# Patient Record
Sex: FEMALE | Race: WHITE | Employment: OTHER | URBAN - METROPOLITAN AREA
[De-identification: names, ages, dates, MRNs, and addresses within clinical notes are randomized per-mention and may not be internally consistent; named-entity substitution may affect disease eponyms.]

---

## 2021-02-02 ENCOUNTER — TRANSFERRED RECORDS (OUTPATIENT)
Dept: HEALTH INFORMATION MANAGEMENT | Facility: CLINIC | Age: 71
End: 2021-02-02

## 2021-02-12 ENCOUNTER — TRANSFERRED RECORDS (OUTPATIENT)
Dept: HEALTH INFORMATION MANAGEMENT | Facility: CLINIC | Age: 71
End: 2021-02-12

## 2021-02-17 ENCOUNTER — OFFICE VISIT (OUTPATIENT)
Dept: OTHER | Facility: CLINIC | Age: 71
End: 2021-02-17
Attending: SURGERY
Payer: COMMERCIAL

## 2021-02-17 ENCOUNTER — TRANSFERRED RECORDS (OUTPATIENT)
Dept: HEALTH INFORMATION MANAGEMENT | Facility: CLINIC | Age: 71
End: 2021-02-17

## 2021-02-17 ENCOUNTER — TELEPHONE (OUTPATIENT)
Dept: OTHER | Facility: CLINIC | Age: 71
End: 2021-02-17

## 2021-02-17 VITALS
BODY MASS INDEX: 25 KG/M2 | HEIGHT: 59 IN | RESPIRATION RATE: 16 BRPM | SYSTOLIC BLOOD PRESSURE: 153 MMHG | WEIGHT: 124 LBS | DIASTOLIC BLOOD PRESSURE: 75 MMHG | HEART RATE: 75 BPM | OXYGEN SATURATION: 100 %

## 2021-02-17 DIAGNOSIS — I65.22 LEFT CAROTID ARTERY STENOSIS: Primary | ICD-10-CM

## 2021-02-17 DIAGNOSIS — Z11.59 ENCOUNTER FOR SCREENING FOR OTHER VIRAL DISEASES: ICD-10-CM

## 2021-02-17 DIAGNOSIS — E78.5 HYPERLIPIDEMIA LDL GOAL <70: ICD-10-CM

## 2021-02-17 PROCEDURE — G0463 HOSPITAL OUTPT CLINIC VISIT: HCPCS

## 2021-02-17 PROCEDURE — 99204 OFFICE O/P NEW MOD 45 MIN: CPT | Performed by: SURGERY

## 2021-02-17 RX ORDER — METHOCARBAMOL 750 MG/1
750-1500 TABLET, FILM COATED ORAL
COMMUNITY
Start: 2021-01-26 | End: 2021-02-22

## 2021-02-17 RX ORDER — AMLODIPINE BESYLATE 10 MG/1
10 TABLET ORAL EVERY MORNING
COMMUNITY
Start: 2020-08-20

## 2021-02-17 RX ORDER — PHENOL 1.4 %
2.5 AEROSOL, SPRAY (ML) MUCOUS MEMBRANE EVERY MORNING
COMMUNITY

## 2021-02-17 RX ORDER — AMOXICILLIN 500 MG
CAPSULE ORAL
COMMUNITY
End: 2021-02-22

## 2021-02-17 RX ORDER — BUSPIRONE HYDROCHLORIDE 15 MG/1
15 TABLET ORAL 2 TIMES DAILY
COMMUNITY
Start: 2021-01-15

## 2021-02-17 RX ORDER — PAROXETINE 40 MG/1
40 TABLET, FILM COATED ORAL EVERY MORNING
COMMUNITY
Start: 2021-01-14

## 2021-02-17 RX ORDER — METOPROLOL SUCCINATE 25 MG/1
25 TABLET, EXTENDED RELEASE ORAL EVERY EVENING
COMMUNITY
Start: 2020-10-07

## 2021-02-17 RX ORDER — ATORVASTATIN CALCIUM 10 MG/1
10 TABLET, FILM COATED ORAL EVERY EVENING
COMMUNITY
Start: 2020-08-20 | End: 2021-08-20

## 2021-02-17 RX ORDER — CLONIDINE HYDROCHLORIDE 0.1 MG/1
0.1 TABLET ORAL 2 TIMES DAILY
COMMUNITY
Start: 2021-01-27

## 2021-02-17 RX ORDER — ALENDRONATE SODIUM 70 MG/1
70 TABLET ORAL
COMMUNITY
Start: 2021-01-26 | End: 2021-02-22

## 2021-02-17 RX ORDER — LOSARTAN POTASSIUM 100 MG/1
100 TABLET ORAL EVERY MORNING
COMMUNITY
Start: 2020-08-20

## 2021-02-17 RX ORDER — METHIMAZOLE 10 MG/1
5 TABLET ORAL EVERY EVENING
COMMUNITY
Start: 2021-01-28

## 2021-02-17 ASSESSMENT — MIFFLIN-ST. JEOR: SCORE: 983.09

## 2021-02-17 NOTE — PROGRESS NOTES
"Jackie Torres is a 70 year old female who presents for:  Chief Complaint   Patient presents with     RECHECK     Ref by Dr. Neal for carotid artery stenosis *enrique        Vitals:    Vitals:    02/17/21 1407 02/17/21 1408   BP: (!) 152/72 (!) 153/75   BP Location: Right arm Left arm   Patient Position: Chair Chair   Cuff Size: Adult Regular Adult Regular   Pulse: 75    Resp: 16    SpO2: 100%    Weight: 124 lb (56.2 kg)    Height: 4' 11\" (1.499 m)        BMI:  Estimated body mass index is 25.04 kg/m  as calculated from the following:    Height as of this encounter: 4' 11\" (1.499 m).    Weight as of this encounter: 124 lb (56.2 kg).    Pain Score:  Data Unavailable        Jennifer Lynch [facility]    "

## 2021-02-17 NOTE — TELEPHONE ENCOUNTER
Patient referred by Dr. Neal to Dr. Rangel for carotid artery stenosis. Dr. Neal spoke directly to Dr. Rangel.  Patient [facility].  Requested CTA carotid/[place] of [place] to be pushed to [facility].    Spoke with patient directly and she agreed to come in to clinic for consult.  Instructions and directions provided.  She is aware to wear a mask and may have one family member with.    Rosario Fried, KERENN, RN-[facility] [place]

## 2021-02-17 NOTE — NURSING NOTE
Patient Education    Procedure: LEFT CAROTID ENDARTERECTOMY WITH EEG  Diagnosis: LEFT CAROTID ARTERY STENOSIS  Anticoagulation Instruction: N/A, OK TO CONTINUE ASA  Pre-Operative Physical Exam: You need to have a pre-op physical exam within 30 days of your procedure. Your procedure may be cancelled if you do not have a current History and Physical. Call your PCP's office to schedule.  Allergies:  Updated in Epic  Bowel Prep: NPO per protocol.   Post Procedure Education: Vascular [facility] patient post-procedure fact sheet reviewed with patient.    COVID-19 instructions for isolation and pre testing reviewed with pt.    Learner(s):patient  Method: Listening  Barriers to Learning:No Barrier  Outcome: Patient did verbalize understanding of above education.    Rosario Fried, KERENN, RN-[facility]

## 2021-02-19 ENCOUNTER — TRANSFERRED RECORDS (OUTPATIENT)
Dept: HEALTH INFORMATION MANAGEMENT | Facility: CLINIC | Age: 71
End: 2021-02-19

## 2021-02-19 DIAGNOSIS — Z11.59 ENCOUNTER FOR SCREENING FOR OTHER VIRAL DISEASES: ICD-10-CM

## 2021-02-19 LAB
SARS-COV-2 RNA RESP QL NAA+PROBE: NORMAL
SPECIMEN SOURCE: NORMAL

## 2021-02-19 PROCEDURE — U0003 INFECTIOUS AGENT DETECTION BY NUCLEIC ACID (DNA OR RNA); SEVERE ACUTE RESPIRATORY SYNDROME CORONAVIRUS 2 (SARS-COV-2) (CORONAVIRUS DISEASE [COVID-19]), AMPLIFIED PROBE TECHNIQUE, MAKING USE OF HIGH THROUGHPUT TECHNOLOGIES AS DESCRIBED BY CMS-2020-01-R: HCPCS | Performed by: SURGERY

## 2021-02-19 PROCEDURE — U0005 INFEC AGEN DETEC AMPLI PROBE: HCPCS | Performed by: SURGERY

## 2021-02-20 LAB
LABORATORY COMMENT REPORT: NORMAL
SARS-COV-2 RNA RESP QL NAA+PROBE: NEGATIVE
SPECIMEN SOURCE: NORMAL

## 2021-02-22 ENCOUNTER — TELEPHONE (OUTPATIENT)
Dept: OTHER | Facility: CLINIC | Age: 71
End: 2021-02-22

## 2021-02-22 ENCOUNTER — ANESTHESIA EVENT (OUTPATIENT)
Dept: SURGERY | Facility: CLINIC | Age: 71
DRG: 039 | End: 2021-02-22
Payer: COMMERCIAL

## 2021-02-22 RX ORDER — CHOLECALCIFEROL (VITAMIN D3) 50 MCG
1 TABLET ORAL DAILY
COMMUNITY

## 2021-02-22 RX ORDER — MULTIVIT WITH MINERALS/LUTEIN
1 TABLET ORAL EVERY MORNING
COMMUNITY

## 2021-02-22 ASSESSMENT — LIFESTYLE VARIABLES: TOBACCO_USE: 0

## 2021-02-22 NOTE — PROGRESS NOTES
"PTA medications updated by Medication Scribe prior to surgery via phone call with patient        Comments:    Medication history sources: Patient, Surescripts and H&P  Medication history source reliability: Good  Adherence assessment: N/A Not Observed    Significant changes made to the medication list:  Patient reports no longer taking the following meds (med scribe removed from PTA med list): Alendronate - pt states not started; Methocarbamol, Omega 3      Additional medication history information:   Pt taking Clonidine differently than prescribed; RX states \"take 2 x 0.1mg = 0.2mg twice daily\" ; Pt only takes 0.1mg twice daily. She states taking 2 tablets twice daily results in her blood pressure \"going too low\". She states she has discussed this with her MD.         Prior to Admission medications    Medication Sig Last Dose Taking? Auth Provider   amLODIPine (NORVASC) 10 MG tablet Take 10 mg by mouth every morning   at AM Yes Reported, Patient   aspirin (ASA) 81 MG EC tablet Take 81 mg by mouth every morning   at AM Yes Reported, Patient   atorvastatin (LIPITOR) 10 MG tablet Take 10 mg by mouth every evening   at PM Yes Reported, Patient   busPIRone (BUSPAR) 15 MG tablet Take 15 mg by mouth 2 times daily Morning and afternoon (around 1600)  Yes Reported, Patient   calcium carbonate (OS-TIMMY) 600 MG tablet Take 2.5 tablets by mouth every morning   at AM Yes Reported, Patient   cloNIDine (CATAPRES) 0.1 MG tablet Take 0.1 mg by mouth 2 times daily Morning and afternoon (around 1600)  Yes Reported, Patient   coenzyme Q-10 10 MG CAPS Take 1 tablet by mouth every evening   at PM Yes Reported, Patient   Cyanocobalamin 500 MCG CHEW Take 1 chew tab by mouth every morning  at AM Yes Reported, Patient   losartan (COZAAR) 100 MG tablet Take 100 mg by mouth every morning   at AM Yes Reported, Patient   methimazole (TAPAZOLE) 10 MG tablet Take 5 mg by mouth every evening (1/2 x 10mg)  at PM Yes Reported, Patient   metoprolol " succinate ER (TOPROL-XL) 25 MG 24 hr tablet Take 25 mg by mouth every evening   at PM Yes Reported, Patient   Multiple Vitamins-Minerals (PRESERVISION AREDS PO) Take 1 capsule by mouth 2 times daily  Yes Reported, Patient   multivitamin (CENTRUM SILVER) tablet Take 1 tablet by mouth every morning  at AM Yes Reported, Patient   omeprazole (PRILOSEC) 20 MG DR capsule Take 20 mg by mouth every morning   at AM Yes Reported, Patient   PARoxetine (PAXIL) 40 MG tablet Take 40 mg by mouth every morning   at AM Yes Reported, Patient   vitamin D3 (CHOLECALCIFEROL) 50 mcg (2000 units) tablet Take 1 tablet by mouth daily  at AM Yes Reported, Patient

## 2021-02-22 NOTE — TELEPHONE ENCOUNTER
[place] [facility]    I called Jackie Torres about her left CEA tomorrow.  We have been following a progressively increasing stenosis which is now greater than 80% but still asymptomatic.    She had no questions about the surgery tomorrow.    2/19/2021 Covid 19 testing is negative.  EKG was also performed.  More recent hemoglobin= 11.9.    8/10/2020 LDL= 52.    We will check her lipid profile, hemoglobin A1c, and BMP tomorrow morning prior to the surgery.       Paxton Rangel MD

## 2021-02-23 ENCOUNTER — ANESTHESIA (OUTPATIENT)
Dept: SURGERY | Facility: CLINIC | Age: 71
DRG: 039 | End: 2021-02-23
Payer: COMMERCIAL

## 2021-02-23 ENCOUNTER — HOSPITAL ENCOUNTER (INPATIENT)
Facility: CLINIC | Age: 71
LOS: 1 days | Discharge: HOME | DRG: 039 | End: 2021-02-24
Attending: SURGERY | Admitting: SURGERY
Payer: COMMERCIAL

## 2021-02-23 ENCOUNTER — APPOINTMENT (OUTPATIENT)
Dept: SURGERY | Facility: PHYSICIAN GROUP | Age: 71
End: 2021-02-23
Payer: COMMERCIAL

## 2021-02-23 DIAGNOSIS — I65.22 LEFT CAROTID ARTERY STENOSIS: ICD-10-CM

## 2021-02-23 LAB
ANION GAP SERPL CALCULATED.3IONS-SCNC: 6 MMOL/L (ref 3–14)
BUN SERPL-MCNC: 14 MG/DL (ref 7–30)
CALCIUM SERPL-MCNC: 9.6 MG/DL (ref 8.5–10.1)
CHLORIDE SERPL-SCNC: 102 MMOL/L (ref 94–109)
CHOLEST SERPL-MCNC: 114 MG/DL
CO2 SERPL-SCNC: 27 MMOL/L (ref 20–32)
CREAT SERPL-MCNC: 0.84 MG/DL (ref 0.52–1.04)
GFR SERPL CREATININE-BSD FRML MDRD: 70 ML/MIN/{1.73_M2}
GLUCOSE SERPL-MCNC: 99 MG/DL (ref 70–99)
HBA1C MFR BLD: 5.5 % (ref 0–5.6)
HDLC SERPL-MCNC: 58 MG/DL
LDLC SERPL CALC-MCNC: 44 MG/DL
NONHDLC SERPL-MCNC: 56 MG/DL
PLATELET # BLD AUTO: 268 10E9/L (ref 150–450)
POTASSIUM SERPL-SCNC: 3.9 MMOL/L (ref 3.4–5.3)
SODIUM SERPL-SCNC: 135 MMOL/L (ref 133–144)
TRIGL SERPL-MCNC: 61 MG/DL

## 2021-02-23 PROCEDURE — 999N000141 HC STATISTIC PRE-PROCEDURE NURSING ASSESSMENT: Performed by: SURGERY

## 2021-02-23 PROCEDURE — 272N000282 HC OR IOM SUPPLIES OPNP: Performed by: SURGERY

## 2021-02-23 PROCEDURE — 83036 HEMOGLOBIN GLYCOSYLATED A1C: CPT | Performed by: SURGERY

## 2021-02-23 PROCEDURE — 370N000017 HC ANESTHESIA TECHNICAL FEE, PER MIN: Performed by: SURGERY

## 2021-02-23 PROCEDURE — 258N000003 HC RX IP 258 OP 636: Performed by: OTOLARYNGOLOGY

## 2021-02-23 PROCEDURE — 250N000013 HC RX MED GY IP 250 OP 250 PS 637: Performed by: SURGERY

## 2021-02-23 PROCEDURE — 258N000003 HC RX IP 258 OP 636: Performed by: NURSE ANESTHETIST, CERTIFIED REGISTERED

## 2021-02-23 PROCEDURE — 250N000009 HC RX 250: Performed by: SURGERY

## 2021-02-23 PROCEDURE — 03CN0ZZ EXTIRPATION OF MATTER FROM LEFT EXTERNAL CAROTID ARTERY, OPEN APPROACH: ICD-10-PCS | Performed by: SURGERY

## 2021-02-23 PROCEDURE — 120N000001 HC R&B MED SURG/OB

## 2021-02-23 PROCEDURE — 250N000013 HC RX MED GY IP 250 OP 250 PS 637: Performed by: STUDENT IN AN ORGANIZED HEALTH CARE EDUCATION/TRAINING PROGRAM

## 2021-02-23 PROCEDURE — 99223 1ST HOSP IP/OBS HIGH 75: CPT | Performed by: INTERNAL MEDICINE

## 2021-02-23 PROCEDURE — 250N000011 HC RX IP 250 OP 636: Performed by: SURGERY

## 2021-02-23 PROCEDURE — 710N000009 HC RECOVERY PHASE 1, LEVEL 1, PER MIN: Performed by: SURGERY

## 2021-02-23 PROCEDURE — 250N000009 HC RX 250: Performed by: NURSE ANESTHETIST, CERTIFIED REGISTERED

## 2021-02-23 PROCEDURE — 250N000025 HC SEVOFLURANE, PER MIN: Performed by: SURGERY

## 2021-02-23 PROCEDURE — 258N000003 HC RX IP 258 OP 636: Performed by: SURGERY

## 2021-02-23 PROCEDURE — 80061 LIPID PANEL: CPT | Performed by: SURGERY

## 2021-02-23 PROCEDURE — 4A10X4G MONITORING OF CENTRAL NERVOUS ELECTRICAL ACTIVITY, INTRAOPERATIVE, EXTERNAL APPROACH: ICD-10-PCS | Performed by: SURGERY

## 2021-02-23 PROCEDURE — 258N000003 HC RX IP 258 OP 636: Performed by: STUDENT IN AN ORGANIZED HEALTH CARE EDUCATION/TRAINING PROGRAM

## 2021-02-23 PROCEDURE — 360N000077 HC SURGERY LEVEL 4, PER MIN: Performed by: SURGERY

## 2021-02-23 PROCEDURE — 36415 COLL VENOUS BLD VENIPUNCTURE: CPT | Performed by: STUDENT IN AN ORGANIZED HEALTH CARE EDUCATION/TRAINING PROGRAM

## 2021-02-23 PROCEDURE — 80048 BASIC METABOLIC PNL TOTAL CA: CPT | Performed by: SURGERY

## 2021-02-23 PROCEDURE — 03UL07Z SUPPLEMENT LEFT INTERNAL CAROTID ARTERY WITH AUTOLOGOUS TISSUE SUBSTITUTE, OPEN APPROACH: ICD-10-PCS | Performed by: SURGERY

## 2021-02-23 PROCEDURE — 272N000001 HC OR GENERAL SUPPLY STERILE: Performed by: SURGERY

## 2021-02-23 PROCEDURE — 36415 COLL VENOUS BLD VENIPUNCTURE: CPT | Performed by: SURGERY

## 2021-02-23 PROCEDURE — 85049 AUTOMATED PLATELET COUNT: CPT | Performed by: STUDENT IN AN ORGANIZED HEALTH CARE EDUCATION/TRAINING PROGRAM

## 2021-02-23 PROCEDURE — 922N000001 HC NEURO MONITORING SERVICE, UP TO 7 HOURS (T1FEE): Performed by: SURGERY

## 2021-02-23 PROCEDURE — 250N000011 HC RX IP 250 OP 636: Performed by: NURSE ANESTHETIST, CERTIFIED REGISTERED

## 2021-02-23 PROCEDURE — 03CL0ZZ EXTIRPATION OF MATTER FROM LEFT INTERNAL CAROTID ARTERY, OPEN APPROACH: ICD-10-PCS | Performed by: SURGERY

## 2021-02-23 RX ORDER — PROPOFOL 10 MG/ML
INJECTION, EMULSION INTRAVENOUS PRN
Status: DISCONTINUED | OUTPATIENT
Start: 2021-02-23 | End: 2021-02-23

## 2021-02-23 RX ORDER — NALOXONE HYDROCHLORIDE 0.4 MG/ML
0.4 INJECTION, SOLUTION INTRAMUSCULAR; INTRAVENOUS; SUBCUTANEOUS
Status: DISCONTINUED | OUTPATIENT
Start: 2021-02-23 | End: 2021-02-24 | Stop reason: HOSPADM

## 2021-02-23 RX ORDER — KETOROLAC TROMETHAMINE 30 MG/ML
INJECTION, SOLUTION INTRAMUSCULAR; INTRAVENOUS PRN
Status: DISCONTINUED | OUTPATIENT
Start: 2021-02-23 | End: 2021-02-23

## 2021-02-23 RX ORDER — ASPIRIN 81 MG/1
81 TABLET ORAL EVERY MORNING
Status: DISCONTINUED | OUTPATIENT
Start: 2021-02-24 | End: 2021-02-24 | Stop reason: HOSPADM

## 2021-02-23 RX ORDER — ONDANSETRON 4 MG/1
4 TABLET, ORALLY DISINTEGRATING ORAL EVERY 6 HOURS PRN
Status: DISCONTINUED | OUTPATIENT
Start: 2021-02-23 | End: 2021-02-24 | Stop reason: HOSPADM

## 2021-02-23 RX ORDER — ONDANSETRON 2 MG/ML
INJECTION INTRAMUSCULAR; INTRAVENOUS PRN
Status: DISCONTINUED | OUTPATIENT
Start: 2021-02-23 | End: 2021-02-23

## 2021-02-23 RX ORDER — SODIUM CHLORIDE 9 MG/ML
INJECTION, SOLUTION INTRAVENOUS CONTINUOUS
Status: DISCONTINUED | OUTPATIENT
Start: 2021-02-23 | End: 2021-02-24

## 2021-02-23 RX ORDER — EPHEDRINE SULFATE 50 MG/ML
INJECTION, SOLUTION INTRAMUSCULAR; INTRAVENOUS; SUBCUTANEOUS PRN
Status: DISCONTINUED | OUTPATIENT
Start: 2021-02-23 | End: 2021-02-23

## 2021-02-23 RX ORDER — GLYCOPYRROLATE 0.2 MG/ML
INJECTION, SOLUTION INTRAMUSCULAR; INTRAVENOUS PRN
Status: DISCONTINUED | OUTPATIENT
Start: 2021-02-23 | End: 2021-02-23

## 2021-02-23 RX ORDER — ACETAMINOPHEN 325 MG/1
650 TABLET ORAL EVERY 4 HOURS PRN
Status: DISCONTINUED | OUTPATIENT
Start: 2021-02-26 | End: 2021-02-24 | Stop reason: HOSPADM

## 2021-02-23 RX ORDER — NALOXONE HYDROCHLORIDE 0.4 MG/ML
0.4 INJECTION, SOLUTION INTRAMUSCULAR; INTRAVENOUS; SUBCUTANEOUS
Status: DISCONTINUED | OUTPATIENT
Start: 2021-02-23 | End: 2021-02-23

## 2021-02-23 RX ORDER — ATORVASTATIN CALCIUM 10 MG/1
10 TABLET, FILM COATED ORAL EVERY EVENING
Status: DISCONTINUED | OUTPATIENT
Start: 2021-02-23 | End: 2021-02-24 | Stop reason: HOSPADM

## 2021-02-23 RX ORDER — SODIUM CHLORIDE, SODIUM LACTATE, POTASSIUM CHLORIDE, CALCIUM CHLORIDE 600; 310; 30; 20 MG/100ML; MG/100ML; MG/100ML; MG/100ML
INJECTION, SOLUTION INTRAVENOUS CONTINUOUS
Status: DISCONTINUED | OUTPATIENT
Start: 2021-02-23 | End: 2021-02-23 | Stop reason: HOSPADM

## 2021-02-23 RX ORDER — CLONIDINE HYDROCHLORIDE 0.1 MG/1
0.1 TABLET ORAL 2 TIMES DAILY
Status: DISCONTINUED | OUTPATIENT
Start: 2021-02-23 | End: 2021-02-24 | Stop reason: HOSPADM

## 2021-02-23 RX ORDER — AMOXICILLIN 250 MG
1 CAPSULE ORAL 2 TIMES DAILY
Status: DISCONTINUED | OUTPATIENT
Start: 2021-02-23 | End: 2021-02-24 | Stop reason: HOSPADM

## 2021-02-23 RX ORDER — LIDOCAINE HYDROCHLORIDE 20 MG/ML
INJECTION, SOLUTION INFILTRATION; PERINEURAL PRN
Status: DISCONTINUED | OUTPATIENT
Start: 2021-02-23 | End: 2021-02-23

## 2021-02-23 RX ORDER — HEPARIN SODIUM 5000 [USP'U]/.5ML
5000 INJECTION, SOLUTION INTRAVENOUS; SUBCUTANEOUS EVERY 8 HOURS
Status: DISCONTINUED | OUTPATIENT
Start: 2021-02-24 | End: 2021-02-24 | Stop reason: HOSPADM

## 2021-02-23 RX ORDER — ASPIRIN 600 MG/1
600 SUPPOSITORY RECTAL ONCE
Status: COMPLETED | OUTPATIENT
Start: 2021-02-23 | End: 2021-02-23

## 2021-02-23 RX ORDER — METOPROLOL SUCCINATE 25 MG/1
25 TABLET, EXTENDED RELEASE ORAL EVERY EVENING
Status: DISCONTINUED | OUTPATIENT
Start: 2021-02-23 | End: 2021-02-24 | Stop reason: HOSPADM

## 2021-02-23 RX ORDER — ACETAMINOPHEN 325 MG/1
975 TABLET ORAL EVERY 8 HOURS
Status: DISCONTINUED | OUTPATIENT
Start: 2021-02-23 | End: 2021-02-24 | Stop reason: HOSPADM

## 2021-02-23 RX ORDER — CEFAZOLIN SODIUM 2 G/100ML
2 INJECTION, SOLUTION INTRAVENOUS
Status: COMPLETED | OUTPATIENT
Start: 2021-02-23 | End: 2021-02-23

## 2021-02-23 RX ORDER — NALOXONE HYDROCHLORIDE 0.4 MG/ML
0.2 INJECTION, SOLUTION INTRAMUSCULAR; INTRAVENOUS; SUBCUTANEOUS
Status: DISCONTINUED | OUTPATIENT
Start: 2021-02-23 | End: 2021-02-24 | Stop reason: HOSPADM

## 2021-02-23 RX ORDER — FENTANYL CITRATE 50 UG/ML
INJECTION, SOLUTION INTRAMUSCULAR; INTRAVENOUS PRN
Status: DISCONTINUED | OUTPATIENT
Start: 2021-02-23 | End: 2021-02-23

## 2021-02-23 RX ORDER — BUPIVACAINE HYDROCHLORIDE 5 MG/ML
INJECTION, SOLUTION PERINEURAL PRN
Status: DISCONTINUED | OUTPATIENT
Start: 2021-02-23 | End: 2021-02-23 | Stop reason: HOSPADM

## 2021-02-23 RX ORDER — LABETALOL HYDROCHLORIDE 5 MG/ML
10 INJECTION, SOLUTION INTRAVENOUS EVERY 10 MIN PRN
Status: DISCONTINUED | OUTPATIENT
Start: 2021-02-23 | End: 2021-02-24 | Stop reason: HOSPADM

## 2021-02-23 RX ORDER — OXYCODONE HYDROCHLORIDE 5 MG/1
5-10 TABLET ORAL EVERY 4 HOURS PRN
Status: DISCONTINUED | OUTPATIENT
Start: 2021-02-23 | End: 2021-02-24 | Stop reason: HOSPADM

## 2021-02-23 RX ORDER — CLOPIDOGREL BISULFATE 75 MG/1
75 TABLET ORAL DAILY
Status: DISCONTINUED | OUTPATIENT
Start: 2021-02-23 | End: 2021-02-24 | Stop reason: HOSPADM

## 2021-02-23 RX ORDER — NALOXONE HYDROCHLORIDE 0.4 MG/ML
0.2 INJECTION, SOLUTION INTRAMUSCULAR; INTRAVENOUS; SUBCUTANEOUS
Status: DISCONTINUED | OUTPATIENT
Start: 2021-02-23 | End: 2021-02-23

## 2021-02-23 RX ORDER — LIDOCAINE 40 MG/G
CREAM TOPICAL
Status: DISCONTINUED | OUTPATIENT
Start: 2021-02-23 | End: 2021-02-24 | Stop reason: HOSPADM

## 2021-02-23 RX ORDER — LABETALOL HYDROCHLORIDE 5 MG/ML
INJECTION, SOLUTION INTRAVENOUS PRN
Status: DISCONTINUED | OUTPATIENT
Start: 2021-02-23 | End: 2021-02-23

## 2021-02-23 RX ORDER — HEPARIN SODIUM 1000 [USP'U]/ML
INJECTION, SOLUTION INTRAVENOUS; SUBCUTANEOUS PRN
Status: DISCONTINUED | OUTPATIENT
Start: 2021-02-23 | End: 2021-02-23 | Stop reason: HOSPADM

## 2021-02-23 RX ORDER — DEXAMETHASONE SODIUM PHOSPHATE 4 MG/ML
INJECTION, SOLUTION INTRA-ARTICULAR; INTRALESIONAL; INTRAMUSCULAR; INTRAVENOUS; SOFT TISSUE PRN
Status: DISCONTINUED | OUTPATIENT
Start: 2021-02-23 | End: 2021-02-23

## 2021-02-23 RX ORDER — HEPARIN SODIUM 1000 [USP'U]/ML
INJECTION, SOLUTION INTRAVENOUS; SUBCUTANEOUS PRN
Status: DISCONTINUED | OUTPATIENT
Start: 2021-02-23 | End: 2021-02-23

## 2021-02-23 RX ORDER — ONDANSETRON 2 MG/ML
4 INJECTION INTRAMUSCULAR; INTRAVENOUS EVERY 6 HOURS PRN
Status: DISCONTINUED | OUTPATIENT
Start: 2021-02-23 | End: 2021-02-24 | Stop reason: HOSPADM

## 2021-02-23 RX ORDER — HYDROMORPHONE HYDROCHLORIDE 1 MG/ML
.3-.5 INJECTION, SOLUTION INTRAMUSCULAR; INTRAVENOUS; SUBCUTANEOUS EVERY 5 MIN PRN
Status: DISCONTINUED | OUTPATIENT
Start: 2021-02-23 | End: 2021-02-23 | Stop reason: HOSPADM

## 2021-02-23 RX ORDER — ONDANSETRON 2 MG/ML
4 INJECTION INTRAMUSCULAR; INTRAVENOUS EVERY 30 MIN PRN
Status: DISCONTINUED | OUTPATIENT
Start: 2021-02-23 | End: 2021-02-23 | Stop reason: HOSPADM

## 2021-02-23 RX ORDER — PAROXETINE 40 MG/1
40 TABLET, FILM COATED ORAL EVERY MORNING
Status: DISCONTINUED | OUTPATIENT
Start: 2021-02-24 | End: 2021-02-24 | Stop reason: HOSPADM

## 2021-02-23 RX ORDER — MAGNESIUM HYDROXIDE 1200 MG/15ML
LIQUID ORAL PRN
Status: DISCONTINUED | OUTPATIENT
Start: 2021-02-23 | End: 2021-02-23 | Stop reason: HOSPADM

## 2021-02-23 RX ORDER — BUSPIRONE HYDROCHLORIDE 15 MG/1
15 TABLET ORAL 2 TIMES DAILY
Status: DISCONTINUED | OUTPATIENT
Start: 2021-02-23 | End: 2021-02-24 | Stop reason: HOSPADM

## 2021-02-23 RX ORDER — LOSARTAN POTASSIUM 100 MG/1
100 TABLET ORAL EVERY MORNING
Status: DISCONTINUED | OUTPATIENT
Start: 2021-02-24 | End: 2021-02-24 | Stop reason: HOSPADM

## 2021-02-23 RX ORDER — AMLODIPINE BESYLATE 10 MG/1
10 TABLET ORAL EVERY MORNING
Status: DISCONTINUED | OUTPATIENT
Start: 2021-02-24 | End: 2021-02-24 | Stop reason: HOSPADM

## 2021-02-23 RX ORDER — AMOXICILLIN 250 MG
2 CAPSULE ORAL 2 TIMES DAILY
Status: DISCONTINUED | OUTPATIENT
Start: 2021-02-23 | End: 2021-02-24 | Stop reason: HOSPADM

## 2021-02-23 RX ORDER — FENTANYL CITRATE 50 UG/ML
25-50 INJECTION, SOLUTION INTRAMUSCULAR; INTRAVENOUS
Status: DISCONTINUED | OUTPATIENT
Start: 2021-02-23 | End: 2021-02-23 | Stop reason: HOSPADM

## 2021-02-23 RX ORDER — NEOSTIGMINE METHYLSULFATE 1 MG/ML
VIAL (ML) INJECTION PRN
Status: DISCONTINUED | OUTPATIENT
Start: 2021-02-23 | End: 2021-02-23

## 2021-02-23 RX ORDER — METHIMAZOLE 5 MG/1
5 TABLET ORAL EVERY EVENING
Status: DISCONTINUED | OUTPATIENT
Start: 2021-02-23 | End: 2021-02-24 | Stop reason: HOSPADM

## 2021-02-23 RX ORDER — ONDANSETRON 4 MG/1
4 TABLET, ORALLY DISINTEGRATING ORAL EVERY 30 MIN PRN
Status: DISCONTINUED | OUTPATIENT
Start: 2021-02-23 | End: 2021-02-23 | Stop reason: HOSPADM

## 2021-02-23 RX ORDER — FENTANYL CITRATE 50 UG/ML
25-100 INJECTION, SOLUTION INTRAMUSCULAR; INTRAVENOUS
Status: DISCONTINUED | OUTPATIENT
Start: 2021-02-23 | End: 2021-02-23 | Stop reason: HOSPADM

## 2021-02-23 RX ADMIN — ROCURONIUM BROMIDE 30 MG: 10 INJECTION INTRAVENOUS at 07:35

## 2021-02-23 RX ADMIN — ASPIRIN 600 MG: 600 SUPPOSITORY RECTAL at 10:36

## 2021-02-23 RX ADMIN — SODIUM CHLORIDE, POTASSIUM CHLORIDE, SODIUM LACTATE AND CALCIUM CHLORIDE: 600; 310; 30; 20 INJECTION, SOLUTION INTRAVENOUS at 09:27

## 2021-02-23 RX ADMIN — NEOSTIGMINE METHYLSULFATE 3 MG: 1 INJECTION, SOLUTION INTRAVENOUS at 09:48

## 2021-02-23 RX ADMIN — LABETALOL HYDROCHLORIDE 10 MG: 5 INJECTION INTRAVENOUS at 10:00

## 2021-02-23 RX ADMIN — PROPOFOL 100 MG: 10 INJECTION, EMULSION INTRAVENOUS at 07:35

## 2021-02-23 RX ADMIN — SODIUM CHLORIDE, POTASSIUM CHLORIDE, SODIUM LACTATE AND CALCIUM CHLORIDE: 600; 310; 30; 20 INJECTION, SOLUTION INTRAVENOUS at 07:15

## 2021-02-23 RX ADMIN — FENTANYL CITRATE 50 MCG: 50 INJECTION, SOLUTION INTRAMUSCULAR; INTRAVENOUS at 07:58

## 2021-02-23 RX ADMIN — Medication 5 MG: at 08:12

## 2021-02-23 RX ADMIN — Medication 5 MG: at 09:25

## 2021-02-23 RX ADMIN — FENTANYL CITRATE 50 MCG: 50 INJECTION, SOLUTION INTRAMUSCULAR; INTRAVENOUS at 07:35

## 2021-02-23 RX ADMIN — METHIMAZOLE 5 MG: 5 TABLET ORAL at 21:07

## 2021-02-23 RX ADMIN — CLOPIDOGREL BISULFATE 75 MG: 75 TABLET ORAL at 17:46

## 2021-02-23 RX ADMIN — METOPROLOL SUCCINATE 25 MG: 25 TABLET, EXTENDED RELEASE ORAL at 21:07

## 2021-02-23 RX ADMIN — ROCURONIUM BROMIDE 10 MG: 10 INJECTION INTRAVENOUS at 08:11

## 2021-02-23 RX ADMIN — CLONIDINE HYDROCHLORIDE 0.1 MG: 0.1 TABLET ORAL at 15:56

## 2021-02-23 RX ADMIN — LIDOCAINE HYDROCHLORIDE 60 MG: 20 INJECTION, SOLUTION INFILTRATION; PERINEURAL at 07:35

## 2021-02-23 RX ADMIN — ATORVASTATIN CALCIUM 10 MG: 10 TABLET, FILM COATED ORAL at 21:07

## 2021-02-23 RX ADMIN — KETOROLAC TROMETHAMINE 15 MG: 30 INJECTION, SOLUTION INTRAMUSCULAR at 09:04

## 2021-02-23 RX ADMIN — DOCUSATE SODIUM 50 MG AND SENNOSIDES 8.6 MG 2 TABLET: 8.6; 5 TABLET, FILM COATED ORAL at 21:06

## 2021-02-23 RX ADMIN — SODIUM CHLORIDE: 9 INJECTION, SOLUTION INTRAVENOUS at 12:01

## 2021-02-23 RX ADMIN — ACETAMINOPHEN 975 MG: 325 TABLET, FILM COATED ORAL at 13:28

## 2021-02-23 RX ADMIN — BUSPIRONE HYDROCHLORIDE 15 MG: 15 TABLET ORAL at 15:56

## 2021-02-23 RX ADMIN — HYDROMORPHONE HYDROCHLORIDE 0.5 MG: 1 INJECTION, SOLUTION INTRAMUSCULAR; INTRAVENOUS; SUBCUTANEOUS at 08:00

## 2021-02-23 RX ADMIN — PHENYLEPHRINE HYDROCHLORIDE 0.25 MCG/KG/MIN: 10 INJECTION INTRAVENOUS at 07:35

## 2021-02-23 RX ADMIN — CEFAZOLIN SODIUM 2 G: 2 INJECTION, SOLUTION INTRAVENOUS at 07:47

## 2021-02-23 RX ADMIN — ACETAMINOPHEN 975 MG: 325 TABLET, FILM COATED ORAL at 21:08

## 2021-02-23 RX ADMIN — DEXAMETHASONE SODIUM PHOSPHATE 4 MG: 4 INJECTION, SOLUTION INTRA-ARTICULAR; INTRALESIONAL; INTRAMUSCULAR; INTRAVENOUS; SOFT TISSUE at 07:56

## 2021-02-23 RX ADMIN — HEPARIN SODIUM 5000 UNITS: 1000 INJECTION INTRAVENOUS; SUBCUTANEOUS at 08:26

## 2021-02-23 RX ADMIN — GLYCOPYRROLATE 0.6 MG: 0.2 INJECTION, SOLUTION INTRAMUSCULAR; INTRAVENOUS at 09:48

## 2021-02-23 RX ADMIN — ONDANSETRON 4 MG: 2 INJECTION INTRAMUSCULAR; INTRAVENOUS at 09:29

## 2021-02-23 ASSESSMENT — ACTIVITIES OF DAILY LIVING (ADL)
ADLS_ACUITY_SCORE: 15

## 2021-02-23 ASSESSMENT — MIFFLIN-ST. JEOR: SCORE: 972.66

## 2021-02-23 NOTE — PROGRESS NOTES
"VASCULAR SURGERY PROGRESS NOTE    Subjective:  Pt found sitting in bed. Pt reports that she's feeling well. Her incisional pain is controlled with PO tylenol. She has some throat soreness from intubation, but no difficulty speaking or swallowing. No weakness or deficits.     Objective:  Intake/Output Summary (Last 24 hours) at 2/23/2021 1407  Last data filed at 2/23/2021 1100  Gross per 24 hour   Intake 1450 ml   Output 20 ml   Net 1430 ml     PHYSICAL EXAM:  /59   Pulse 86   Temp 98.2  F (36.8  C) (Oral)   Resp 18   Ht 1.499 m (4' 11\")   Wt 55.2 kg (121 lb 11.2 oz)   SpO2 97%   BMI 24.58 kg/m    General: The patient is alert and oriented. Appropriate. No acute distress  Psych: pleasant affect, answers questions appropriately  Skin: Color appropriate for race, warm, dry.  Respiratory: The patient does require minimal supplemental oxygen O2 via nasal cannula. Breathing unlabored  Vascular: CN II-XII in tact other than some minor left facial droop. Post-op dressing C/D/I      ASSESSMENT:  70-year-old female with high-grade asymptomatic left carotid stenosis S/P left CEA 2/23/2021    PLAN:  -ADAT  -Continue neurovascular monitoring  -PO meds for pain control  -Appreciate vascular medicine recs  -Likely discharge home tomorrow    Discussed pt history, exam, assessment and plan with Dr. Rangel of the vascular surgery service, who is in agreement with the above.    Natalya Gutierrez PA-C   Division of Vascular Surgery   Pager: [phone]                  "

## 2021-02-23 NOTE — ANESTHESIA POSTPROCEDURE EVALUATION
Patient: Jackie Torres    Procedure(s):  LEFT CAROTID ENDARTERECTOMY WITH FACIAL VEIN PATCH AND WITH ELECTROENCEPHALOGRAM    Diagnosis:Left carotid artery stenosis [I65.22]  Diagnosis Additional Information: No value filed.    Anesthesia Type:  General    Note:     Postop Pain Control: Uneventful            Sign Out: Well controlled pain   PONV: No   Neuro/Psych: Uneventful            Sign Out: Acceptable/Baseline neuro status   Airway/Respiratory: Uneventful            Sign Out: Acceptable/Baseline resp. status   CV/Hemodynamics: Uneventful            Sign Out: Acceptable CV status   Other NRE: NONE   DID A NON-ROUTINE EVENT OCCUR? No         Last vitals:  Vitals:    02/23/21 1110 02/23/21 1120 02/23/21 1130   BP: 116/54 116/56    Pulse: 73 76 76   Resp: 14 8 8   Temp:      SpO2: 98% 98%        Last vitals prior to Anesthesia Care Transfer:  CRNA VITALS  2/23/2021 0932 - 2/23/2021 1032      2/23/2021             ART BP:  165/73    ART Mean:  115    Resp Rate (set):  10          Electronically Signed By: Zan Garcia MD  February 23, 2021  11:43 AM

## 2021-02-23 NOTE — ANESTHESIA PROCEDURE NOTES
ARTERIAL LINE PROCEDURE NOTE  Pre-Procedure   Staff -   Anesthesiologist:  Zan Garcia MD  Performed By: anesthesiologist  Location: pre-op  Pre-Anesthestic Checklist: patient identified, IV checked, site marked, risks and benefits discussed, informed consent, monitors and equipment checked, pre-op evaluation and at physician/surgeon's request  Timeout:  Correct Patient: Yes   Correct Procedure: Yes   Correct Site: Yes   Correct Position: Yes   Correct Laterality: Yes   Site Marked: Yes, N/A    Procedure   Procedure: arterial line  Insertion Site: radial.  Patient Position:supine  Sterile Prep   standard elements of sterile barrier followed  Patient Prep/Sterile Barriers: hand hygiene, sterile gloves, hat, mask, draped, sterile gown, Chloraprep, draped  Skin prep: Chloraprep  Insertion/Injection   Local skin infiltrated with 2 mL of 1% lidocaine.   Injection technique: Seldinger Technique and ultrasound guided (No image obtained for permanent record)   Narrative   Tegaderm dressing used.  Arterial waveform: Yes IBP within 10% of NIBP: Yes

## 2021-02-23 NOTE — PROGRESS NOTES
Pt's neuros intact, A&Ox4.  Sacral mepilex applied.  Clothes, shoes, cell phone and  and black personal bag sent with plastic clothes bag to PACU.  Significant other, Destini, declined text option, home phone number [phone] is best contact number.

## 2021-02-23 NOTE — CONSULTS
[facility]    Vascular Medicine Consultation     Date of Admission:  2/23/2021  Date of Consult (When I saw the patient): 02/23/21         Physician Supervisory Attestation:   I have reviewed and discussed with the physician assistant their history, physical and plan and independently interviewed and examined Jackie Torres and agree with the plan as stated in the physician assistant note.     We were asked by Dr. Rangel to evaluate vascular risk factors and assist with medical management in this 70 year old female non-smoker who presented to the hospital for a left carotid endarterectomy for asymptomatic high grade left carotid artery stenosis.   She underwent left carotid endarterectomy with facial vein patch angioplasty  She was seen and evaluated after surgery, hemodynamically stable, arterial line roughly correlating with cuff blood pressure  Reviewed recent imaging studies and  evaluation  No neuro deficits, surgical site looks good    At present or recommendations,  Postoperative cares, antiplatelet medication per   Neurochecks  Monitor blood pressure and keep systolic blood pressure under 140, if needed use IV labetalol as needed  Restart PTA medications same  LDL well controlled with current statin continue the same  Continue methimazole recent thyroid function tests , CBC and liver panel normal    Thank you for the consultation   This note was dictated by utilizing Dragon software    Copy of this note to  and primary care physician    Martina Gaines MD, [place], [facility]  Vascular Medicine  Clinical hypertension specialist  Clinical Lipidologist   2/23/2021      Assessment & Plan   1. Asymptomatic high grade left carotid artery stenosis s/p left CEA 2/23/21    Post-operative cares and anti-platelet therapy per Dr. Rangel / Vascular Surgery. She had been on Aspirin 81 mg daily as an outpatient. She is neurologically intact post-operatively. Possible  discharge tomorrow if she does well this evening.     2. Hyperlipidemia    Her lipid panel this admission is significant for an LDL of 44, HDL 58, triglycerides 61, and total cholesterol 114 while on Atorvastatin 10 mg daily. This indicates that her lipids are very well controlled with an LDL of less than 70 and she should continue he same.     3. Hypertension    Her prior to admission losartan, amlodipine, metoprolol and clonidine will be continued and her blood pressure will be monitored closely.     4. Hyperthyroidism    TSH was normal one month ago. Continue Methimazole.     5. Depression/Anxiety    Continue Paxil and Buspar.     6. GERD    Continue Omeprazole.       Reason for Consult   Reason for consult: Asked by Dr. Rangel to evaluate vascular risk factors and assist with medical management in this 70 year old female non-smoker who presented to the hospital for a left carotid endarterectomy for asymptomatic high grade left carotid artery stenosis.     Primary Care Physician   Tanvi Marroquin      History of Present Illness   Jackie Torres is a 70 year old female non-smoker with a history of hyperlipidemia, hypertension, hyperthyroidism, and GERD who had been noticed on a screening exam to have an asymptomatic left carotid stenosis.  CTA was performed initially 8/20/2020 where the left carotid stenosis was 69% per NASCET criteria with minimal disease on the right, normal vertebral arteries, and a normal [place] of [place].  She had a follow-up CT scan performed on 2/12/2021 where the stenosis had increased now to 80% in the proximal left internal carotid artery with minimal disease otherwise.     She saw Dr. Neal of vascular surgery in his office on 2/17/2021.  He reviewed the images with her and referred her to see Dr. Rangel for possible left CEA.      She has been completely asymptomatic with no TIA symptoms.  No history of any CVA or other issues in the past. She presented today for a left carotid  endarterectomy.     Past Medical History   Past Medical History:   Diagnosis Date     Complication of anesthesia     nausea and vomiting post-op in recovery     Depression      Gastroesophageal reflux disease      Graves disease      Hypercholesteremia      Hypertension      Hypothyroidism      PONV (postoperative nausea and vomiting)        Past Surgical History   Past Surgical History:   Procedure Laterality Date     BREAST SURGERY      right breat biopsy     BREAST SURGERY      mastectomy partial right     COLONOSCOPY       EYE SURGERY      cataract bilateral     GYN SURGERY      curettage postpartum       Prior to Admission Medications   Prior to Admission Medications   Prescriptions Last Dose Informant Patient Reported? Taking?   Cyanocobalamin 500 MCG CHEW 2/23/2021 at 0400 Self Yes Yes   Sig: Take 1 chew tab by mouth every morning   Multiple Vitamins-Minerals (PRESERVISION AREDS PO) 2/23/2021 at 0400 Self Yes Yes   Sig: Take 1 capsule by mouth 2 times daily   PARoxetine (PAXIL) 40 MG tablet 2/23/2021 at 0400 Self Yes Yes   Sig: Take 40 mg by mouth every morning    amLODIPine (NORVASC) 10 MG tablet 2/23/2021 at 0400 Self Yes Yes   Sig: Take 10 mg by mouth every morning    aspirin (ASA) 81 MG EC tablet 2/23/2021 at 0400 Self Yes Yes   Sig: Take 81 mg by mouth every morning    atorvastatin (LIPITOR) 10 MG tablet 2/22/2021 at 2030 Self Yes Yes   Sig: Take 10 mg by mouth every evening    busPIRone (BUSPAR) 15 MG tablet 2/23/2021 at 0400 Self Yes Yes   Sig: Take 15 mg by mouth 2 times daily Morning and afternoon (around 1600)   calcium carbonate (OS-TIMMY) 600 MG tablet 2/23/2021 at 0400 Self Yes Yes   Sig: Take 2.5 tablets by mouth every morning    cloNIDine (CATAPRES) 0.1 MG tablet 2/23/2021 at 0400 Self Yes Yes   Sig: Take 0.1 mg by mouth 2 times daily Morning and afternoon (around 1600)   coenzyme Q-10 10 MG CAPS 2/22/2021 at 2030 Self Yes Yes   Sig: Take 1 tablet by mouth every evening    losartan (COZAAR) 100  MG tablet 2/23/2021 at 0400 Self Yes Yes   Sig: Take 100 mg by mouth every morning    methimazole (TAPAZOLE) 10 MG tablet 2/22/2021 at 2030 Self Yes Yes   Sig: Take 5 mg by mouth every evening (1/2 x 10mg)   metoprolol succinate ER (TOPROL-XL) 25 MG 24 hr tablet 2/22/2021 at 2030 Self Yes Yes   Sig: Take 25 mg by mouth every evening    multivitamin (CENTRUM SILVER) tablet 2/23/2021 at 0400 Self Yes Yes   Sig: Take 1 tablet by mouth every morning   omeprazole (PRILOSEC) 20 MG DR capsule 2/23/2021 at 0400 Self Yes Yes   Sig: Take 20 mg by mouth every morning    vitamin D3 (CHOLECALCIFEROL) 50 mcg (2000 units) tablet 2/23/2021 at 0400 Self Yes Yes   Sig: Take 1 tablet by mouth daily      Facility-Administered Medications: None     Allergies   Allergies   Allergen Reactions     Phenylephrine Other (See Comments)     Heart palpitations while on Methimazole for hyperthyroidism       Social History   Jackie Torres  reports that she has never smoked. She has never used smokeless tobacco. She reports current alcohol use.    Family History   History reviewed. No pertinent family history.    Review of Systems   The 10 point Review of Systems is negative other than noted in the HPI or here.     Physical Exam   Temp: 98.3  F (36.8  C) Temp src: Temporal BP: 116/56 Pulse: 76   Resp: 8 SpO2: 98 % O2 Device: Simple face mask Oxygen Delivery: 8 LPM  Vital Signs with Ranges  Temp:  [98.3  F (36.8  C)-99  F (37.2  C)] 98.3  F (36.8  C)  Pulse:  [63-86] 76  Resp:  [8-16] 8  BP: (108-141)/(54-70) 116/56  MAP:  [69 mmHg-98 mmHg] 69 mmHg  Arterial Line BP: (115-147)/(47-60) 115/47  SpO2:  [96 %-99 %] 98 %  121 lbs 11.2 oz    Constitutional: awake, alert, cooperative, no apparent distress, and appears stated age  Eyes: Lids and lashes normal, pupils equal, round and reactive to light, extra ocular muscles intact, sclera clear, conjunctiva normal  ENT: normocepalic, without obvious abnormality, oropharynx pink and moist  Hematologic /  Lymphatic: no lymphadenopathy  Respiratory: No increased work of breathing, good air exchange, clear to auscultation bilaterally, no crackles or wheezing  Cardiovascular: regular rate and rhythm, normal S1 and S2 and no murmur noted  GI: Normal bowel sounds, soft, non-distended, non-tender  Skin: no redness, warmth, or swelling, no rashes. Surgical site on left neck is c/d/i.   Musculoskeletal: There is no redness, warmth, or swelling of the joints.  Full range of motion noted.  Motor strength is 5 out of 5 all extremities bilaterally.  Tone is normal.  Neurologic: Awake, alert, oriented to name, place and time.  Cranial nerves II-XII are grossly intact.  Motor is 5 out of 5 bilaterally.    Neuropsychiatric:  Normal affect, memory, insight.      Data   Most Recent 3 BMP's:  Recent Labs   Lab Test 02/23/21 0624      POTASSIUM 3.9   CHLORIDE 102   CO2 27   BUN 14   CR 0.84   ANIONGAP 6   TIMMY 9.6   GLC 99     Most Recent Cholesterol Panel:  Recent Labs   Lab Test 02/23/21 0624   CHOL 114   LDL 44   HDL 58   TRIG 61     Most Recent Hemoglobin A1c:  Recent Labs   Lab Test 02/23/21 0624   A1C 5.5

## 2021-02-23 NOTE — ANESTHESIA PROCEDURE NOTES
Airway   Date/Time: 2/23/2021 7:39 AM   Patient location during procedure: OR  Staff -   Anesthesiologist:  Zan Garcia MD  CRNA: Barb Campos APRN CRNA  Performed By: CRNA    Consent for Airway   Urgency: elective    Indications and Patient Condition  Indications for airway management: dagoberto-procedural  Induction type:intravenousMask difficulty assessment: 1 - vent by mask    Final Airway Details  Final airway type: endotracheal airway  Successful airway:ETT - single  Endotracheal Airway Details   ETT size (mm): 7.0  Cuffed: yes  Cuff volume (mL): 5  Successful intubation technique: direct laryngoscopy  Grade View of Cords: 2  Adjucts: stylet  Measured from: gums/teeth  Secured at (cm): 21  Secured with: silk tape  Bite block used: Soft    Post intubation assessment   Placement verified by: capnometry, equal breath sounds and chest rise   Number of attempts at approach: 1  Secured with:silk tape  Ease of procedure: easy  Dentition: Intact and Unchanged

## 2021-02-23 NOTE — PROGRESS NOTES
Vascular Surgery Progress Note:  POST OP CHECK    S:On floor.  Very comfortable.  O:   Vitals:  BP  Min: 108/57  Max: 141/70  Temp  Av.5  F (36.9  C)  Min: 98.2  F (36.8  C)  Max: 99  F (37.2  C)  Pulse  Av.3  Min: 63  Max: 86  I/O last 3 completed shifts:  In: 1690 [P.O.:240; I.V.:1450]  Out: 20 [Blood:20]    Physical Exam: Alert   VSS                             Wd=A                             CN XII and Neuro=A            LDL=44 (excellent)   A1c=5.5    Assessment/Plan:Doing well.   ASA/Plavix.      . MD Juan Carlos

## 2021-02-23 NOTE — OP NOTE
Procedure Date: 02/23/2021      PREOPERATIVE DIAGNOSIS:  High-grade asymptomatic left carotid stenosis.      POSTOPERATIVE DIAGNOSIS:  High-grade asymptomatic left carotid stenosis.      PROCEDURES:   1.  Left carotid endarterectomy with facial vein patch angioplasty.     a.  Intraoperative shunting and EEG monitoring.      SURGEON:  Paxton Rangel MD      ASSISTANT:  Karma Mae MD (Vascular fellow)      ANESTHESIA:  General.      PREOPERATIVE MEDICATIONS:  Ancef 2 grams IV.      INDICATIONS:  This 70-year-old patient has been followed for asymptomatic left carotid stenosis.  By duplex and CTA, this is now greater than 80% with minimal contralateral disease.  We felt that she would benefit from a carotid endarterectomy under informed consent.      OPERATIVE PROCEDURE:  Preinduction, right radial arterial line was placed.  She was brought to the operating room and induced under general anesthesia which went without difficulty.  By duplex, she did not have a well-developed external jugular vein or greater saphenous veins in either ankle.  Calf pneumatic compression boots were used and pillows placed under her knees, rolled towel was placed under her shoulders.  The patient was quite thin.  Left neck area was prepped and draped.  Timeout was called and the sites were identified.      VASCULAR EXPOSURE:  A standard left carotid incision was made.  Dissection was carried down to the platysmus.  We had a dual mandibular cutaneous nerve with a lower branch being larger and this was marked with a 7-0 Prolene suture and transected.  Sternocleidomastoid muscle was retracted laterally.  We dissected down to identify the internal jugular vein.  Facial vein was well-developed and we plan to use this for a patch angioplasty.  This was dissected free, ligating multiple side branches between 7-0 Prolene sutures.  A 2-0 silk tie was placed on the origin of the facial vein and this was transected and controlled with microvascular  madhav.  We then cautiously dissected out the carotid artery.  The ansa cervicalis was well visualized as was the vagus nerve.  With her somewhat lower bifurcation, we identified but had no traction at all on the hypoglossal nerve.  Common carotid artery was free of disease and encircled with Silastic vessel loop.  External carotid artery had a diseased origin, but none distally and all branches were encircled.  We cautiously dissected down the ICA, which was approximately 1.2 cm from the bulb and was completely free of disease measuring approximately 3.5 mm.  This was likewise encircled.      CAROTID ENDARTERECTOMY:  5000 units of intravenous heparin were given.  After 5 minutes, the vessel was sequentially tightened with a stable EEG.  An 11 blade was used to enter the common carotid and a Maria scissors to extend the arteriotomy to the calcified, very stenotic plaque at the carotid bulb and proximal ICA to the distal ICA, which was completely free of disease.  A preheparinized Sundt shunt was inserted and secured with a vessel loop proximally and distally with a stable EEG.      Distal endpoint in the ICA was made with a [place] blade with an excellent minimal transition that was tacked down with several interrupted 7-0 Prolene sutures.  Endarterectomy was performed down to the deep media.  CC endpoint measured less than 1 mm in thickness.  An excellent eversion endarterectomy was performed on the external carotid artery with good backbleeding.  All loose medial fibers were removed down to a very smooth plane.      FACIAL VEIN PATCH:  We then continued to harvest the facial vein.  This was gently dilated with 0.5% Marcaine.  This was everted and left double thickness in the normal direction of flow and sewn to the arteriotomy with running 6-0 Prolene suture.  Prior to complete closure, the shunt was removed and the vessel was flushed and blood flow was sequentially allowed to go up the ICA with an excellent pulse  being noted.  Two 7-0 mattress sutures were placed on the patch site for excellent hemostasis.      Our patch measured 4 cm in length.  A small amount of Surgicel was placed over this.  Neck was closed in layers with interrupted running 3-0 Vicryl suture.  Skin was closed with 4-0 Monocryl in subcuticular fashion.  Wounds were infiltrated with 0.5% Marcaine for post-analgesia along with Toradol 15 mg IV.  Steri-Strips and gauze dressing were applied.      The patient tolerated the procedure well.      ESTIMATED BLOOD LOSS:  Less than 20 mL      COMPLICATIONS:  None.      She was neurologically intact in recovery.         ANNELISE ALTMAN MD             D: 2021   T: 2021   MT: JOSIE      Name:     MIRZA PACHECO   MRN:      [id number]        Account:        [id number]   :      1950           Procedure Date: 2021      Document: [id number]       cc: Annelise Altman MD

## 2021-02-24 VITALS
SYSTOLIC BLOOD PRESSURE: 120 MMHG | DIASTOLIC BLOOD PRESSURE: 64 MMHG | BODY MASS INDEX: 24.76 KG/M2 | HEART RATE: 69 BPM | TEMPERATURE: 98.6 F | RESPIRATION RATE: 14 BRPM | HEIGHT: 59 IN | WEIGHT: 122.8 LBS | OXYGEN SATURATION: 96 %

## 2021-02-24 LAB — GLUCOSE BLDC GLUCOMTR-MCNC: 88 MG/DL (ref 70–99)

## 2021-02-24 PROCEDURE — 250N000011 HC RX IP 250 OP 636: Performed by: STUDENT IN AN ORGANIZED HEALTH CARE EDUCATION/TRAINING PROGRAM

## 2021-02-24 PROCEDURE — 250N000013 HC RX MED GY IP 250 OP 250 PS 637: Performed by: STUDENT IN AN ORGANIZED HEALTH CARE EDUCATION/TRAINING PROGRAM

## 2021-02-24 PROCEDURE — 999N001017 HC STATISTIC GLUCOSE BY METER IP

## 2021-02-24 PROCEDURE — 99233 SBSQ HOSP IP/OBS HIGH 50: CPT | Performed by: INTERNAL MEDICINE

## 2021-02-24 PROCEDURE — 250N000013 HC RX MED GY IP 250 OP 250 PS 637: Performed by: SURGERY

## 2021-02-24 RX ORDER — CLOPIDOGREL BISULFATE 75 MG/1
75 TABLET ORAL DAILY
Qty: 30 TABLET | Refills: 0 | Status: SHIPPED | OUTPATIENT
Start: 2021-02-24

## 2021-02-24 RX ORDER — ACETAMINOPHEN 325 MG/1
650 TABLET ORAL EVERY 4 HOURS PRN
COMMUNITY
Start: 2021-02-26

## 2021-02-24 RX ADMIN — CLONIDINE HYDROCHLORIDE 0.1 MG: 0.1 TABLET ORAL at 08:29

## 2021-02-24 RX ADMIN — AMLODIPINE BESYLATE 10 MG: 10 TABLET ORAL at 08:29

## 2021-02-24 RX ADMIN — HEPARIN SODIUM 5000 UNITS: 5000 INJECTION, SOLUTION INTRAVENOUS; SUBCUTANEOUS at 05:25

## 2021-02-24 RX ADMIN — BUSPIRONE HYDROCHLORIDE 15 MG: 15 TABLET ORAL at 08:29

## 2021-02-24 RX ADMIN — ASPIRIN 81 MG: 81 TABLET ORAL at 08:28

## 2021-02-24 RX ADMIN — CLOPIDOGREL BISULFATE 75 MG: 75 TABLET ORAL at 08:29

## 2021-02-24 RX ADMIN — PAROXETINE HYDROCHLORIDE 40 MG: 40 TABLET, FILM COATED ORAL at 08:28

## 2021-02-24 RX ADMIN — ACETAMINOPHEN 975 MG: 325 TABLET, FILM COATED ORAL at 05:25

## 2021-02-24 RX ADMIN — DOCUSATE SODIUM 50 MG AND SENNOSIDES 8.6 MG 2 TABLET: 8.6; 5 TABLET, FILM COATED ORAL at 08:28

## 2021-02-24 RX ADMIN — OMEPRAZOLE 20 MG: 20 CAPSULE, DELAYED RELEASE ORAL at 08:28

## 2021-02-24 RX ADMIN — LOSARTAN POTASSIUM 100 MG: 100 TABLET, FILM COATED ORAL at 08:29

## 2021-02-24 ASSESSMENT — ACTIVITIES OF DAILY LIVING (ADL)
ADLS_ACUITY_SCORE: 17

## 2021-02-24 ASSESSMENT — MIFFLIN-ST. JEOR: SCORE: 977.65

## 2021-02-24 NOTE — PLAN OF CARE
Alert and oriented x4. VSS. Neuros intact. Dressing STEPHANIE. Incision CDI. Up SBA. Discharge paperwork gone over with pt and questions answered. Sent with belongings and medications.

## 2021-02-24 NOTE — PLAN OF CARE
A+O x 4. P.O. day 1 from a left carotid endarterectomy. Dressing is CDI. VSS on RA. Neuros have been intact. Tele was showing SR with some PVCs. Off of IMC today. Only complaint of pain was slight discomfort at the incision site. No pain meds other than scheduled tylenol used. Standby assist to the bathroom. Regular diet. Full code. Should be discharging to home today.

## 2021-02-24 NOTE — PROGRESS NOTES
[facility]    Vascular Medicine Progress Note    Date of Service (when I saw the patient): 02/24/2021     Assessment & Plan   Jackie Torres is a 70 year old female who was admitted on 2/23/2021.     1. Asymptomatic high grade left carotid artery stenosis s/p left CEA 2/23/21    POD 1  Doing well, no neuro deficits, BP well controlled  Ambulating, voided   Post-operative cares and anti-platelet therapy per Dr. Rangel / Vascular Surgery.   Follow up with Dr. Rangel for post op check and surveillance      2. Hyperlipidemia     Her lipid panel this admission is significant for an LDL of 44, HDL 58, triglycerides 61, and total cholesterol 114 while on Atorvastatin 10 mg daily. This indicates that her lipids are very well controlled with an LDL of less than 70 and she should continue he same.      3. Hypertension     Her prior to admission losartan, amlodipine, metoprolol and clonidine will be continued and her blood pressure will be monitored closely.      4. Hyperthyroidism     TSH was normal one month ago. Continue Methimazole.      5. Depression/Anxiety     Continue Paxil and Buspar.      6. GERD     Continue Omeprazole.     Martina Gaines MD, JOEL, [facility]  Vascular Medicine  Clinical hypertension specialist  Clinical Lipidologist      Interval History     Doing well  BP good range   No neuro deficits     Physical Exam   Temp: 98.6  F (37  C) Temp src: Oral BP: 120/64 Pulse: 69   Resp: 14 SpO2: 96 % O2 Device: None (Room air) Oxygen Delivery: 1 LPM  Vitals:    02/23/21 0618 02/24/21 0606   Weight: 55.2 kg (121 lb 11.2 oz) 55.7 kg (122 lb 12.8 oz)     Vital Signs with Ranges  Temp:  [98.2  F (36.8  C)-98.7  F (37.1  C)] 98.6  F (37  C)  Pulse:  [61-89] 69  Resp:  [8-19] 14  BP: (108-141)/(54-70) 120/64  MAP:  [69 mmHg-98 mmHg] 69 mmHg  Arterial Line BP: (115-147)/(47-60) 115/47  SpO2:  [93 %-99 %] 96 %  I/O last 3 completed shifts:  In: 3510.83 [P.O.:1040; I.V.:2470.83]  Out: 20  [Blood:20]    Constitutional: Awake, alert, cooperative, no apparent distress, and appears stated age.  Eyes: Lids and lashes normal, pupils equal, round and reactive to light, extra ocular muscles intact, sclera clear, conjunctiva normal.  ENT: Normocephalic, without obvious abnormality, surgical site looks good , C/D/I   Respiratory: No increased work of breathing, good air exchange, clear to auscultation bilaterally, no crackles or wheezing.  Cardiovascular: Normal apical impulse, regular rate and rhythm, normal S1 and S2, no S3 or S4, and no murmur noted.  GI:  normal bowel sounds, soft, non-distended, non-tender, no masses palpated, no hepatosplenomegaly.  Lymph/Hematologic: No cervical lymphadenopathy and no supraclavicular lymphadenopathy.  Skin: No bruising or bleeding, normal skin color, texture, turgor, no redness, warmth, or swelling, no rashes, no lesions,  nails normal without discoloration or clubbing and no jaundice.  Musculoskeletal: There is no redness, warmth, or swelling of the joints.  Full range of motion noted.  Motor strength is 5 out of 5 all extremities bilaterally.  Tone is normal.  Neurologic: Awake, alert, oriented to name, place and time.  Cranial nerves II-XII are grossly intact.  Motor is 5 out of 5 bilaterally.   Neuropsychiatric: Calm, normal eye contact, alert, normal affect, oriented to self, place, time and situation, memory for past and recent events intact and thought process normal.  Pulses:  dood pulses.    Medications     BETA BLOCKER NOT PRESCRIBED         acetaminophen  975 mg Oral Q8H     amLODIPine  10 mg Oral QAM     aspirin  81 mg Oral QAM     atorvastatin  10 mg Oral QPM     busPIRone  15 mg Oral BID     cloNIDine  0.1 mg Oral BID     clopidogrel  75 mg Oral Daily     heparin ANTICOAGULANT  5,000 Units Subcutaneous Q8H     losartan  100 mg Oral QAM     methimazole  5 mg Oral QPM     metoprolol succinate ER  25 mg Oral QPM     omeprazole  20 mg Oral QAM     PARoxetine   40 mg Oral QAM     senna-docusate  1 tablet Oral BID    Or     senna-docusate  2 tablet Oral BID       Data   Recent Labs   Lab 02/23/21  1200 02/23/21  0624     --    NA  --  135   POTASSIUM  --  3.9   CHLORIDE  --  102   CO2  --  27   BUN  --  14   CR  --  0.84   ANIONGAP  --  6   TIMMY  --  9.6   GLC  --  99

## 2021-02-24 NOTE — PLAN OF CARE
VSS. A/O. SBA. L neck dressing CDI. Denies pain. Neuros intact. Voiding own. Tolerating diet, slight neasua. Tele SR with mulit PVCs times.

## 2021-02-24 NOTE — DISCHARGE SUMMARY
Vascular Surgery Service Discharge Summary:     NAME: Jackie Torres   MRN: [id number]   : 1950   PCP: Tanvi Marroquin    DATE OF ADMISSION: 2021       Procedure/Surgery Information   Procedure: Procedure(s):  LEFT CAROTID ENDARTERECTOMY WITH FACIAL VEIN PATCH AND WITH ELECTROENCEPHALOGRAM   Surgeon(s): Surgeon(s) and Role:     * Paxton Rangel - Primary     * Karma Mae MD - Fellow - Assisting   Specimens: * No specimens in log *         PRE/POSTOPERATIVE DIAGNOSES:    High-grade asymptomatic left carotid stenosis    PROCEDURES PERFORMED, 2021:   Left carotid endarterectomy with facial vein patch angioplasty with intraoperative shunting and EEG monitoring    INTRAOPERATIVE FINDINGS: None noted    POSTOPERATIVE COMPLICATIONS: None     DATE OF DISCHARGE: 2021    ADMISSION HPI (from 2021): This 70-year-old patient has been followed for asymptomatic left carotid stenosis.  By duplex and CTA, this is now greater than 80% with minimal contralateral disease.  We felt that she would benefit from a carotid endarterectomy under informed consent.     HOSPITAL COURSE: Jackie Torres is a 70 year old female who was admitted on 2021 and underwent the above-named procedures.  she tolerated the procedure well and postoperatively was transferred to the general post-surgical unit.  The remainder of her course was essentiallly uncomplicated.  Prior to discharge, her pain was controlled well with oral analgesics.  she was able to perform ADLs and ambulate independently without difficulty, and had full return of bowel and bladder function.  On 2021, she was discharged to home in stable condition.    Time Spent on this Encounter   I, Natalya Gutierrez PA-C, discharged this patient today but I did not personally see the patient today and will not be billing for the patient's discharge.    Vascular Surgery Core Measures:   Continue Aspirin, Atorvastatin, and Plavix    PAST MEDICAL  HISTORY:  Past Medical History:   Diagnosis Date     Complication of anesthesia     nausea and vomiting post-op in recovery     Depression      Gastroesophageal reflux disease      Graves disease      Hypercholesteremia      Hypertension      Hypothyroidism      PONV (postoperative nausea and vomiting)      PAST SURGICAL HISTORY:  Past Surgical History:   Procedure Laterality Date     BREAST SURGERY      right breat biopsy     BREAST SURGERY      mastectomy partial right     COLONOSCOPY       EYE SURGERY      cataract bilateral     GYN SURGERY      curettage postpartum     Labs:  Recent Labs   Lab Test 02/23/21  1200        Recent Labs   Lab Test 02/23/21  0624   POTASSIUM 3.9   CHLORIDE 102   BUN 14       DISCHARGE INSTRUCTIONS:  Discharge Orders      Reason for your hospital stay    You were in the hospital to have surgery on your carotid artery and to recover from surgery.     Activity    Your activity upon discharge: Over the next couple of weeks, gradually resume your normal activity level.     Discharge Instructions    For the next few weeks after your carotid surgery, please be mindful of any severe headaches and your blood pressure. Your carotid artery now has a lot more blood flowing to your brain, which your body takes a while to adjust to. Sometimes during this adjustment period, you can have blood pressure that is too high to be healthy or too much blood flowing at too high of a pressure going to your brain, which can cause a headache. If this high pressure and high flow to the brain continues too long, it can cause brain damage and other serious issues. So if you have a severe headache in the 2-4 weeks following surgery or measure your systolic blood pressure >180mmHg at home, you should immediately be seen by a medical provider to make sure your blood pressure is appropriately controlled to prevent brain damage or other serious issues.     Discharge Instructions    Your incision is closed with  sutures under the skin that will dissolve and steri strips over your incision (tan strips) to aid with healing. The steri strips will stay in place for ~7 days and then gradually fall off. This is expected. You can get the incision wet, but do not submerge in water for at least 3 weeks post-operatively until incision is well-healed (showers are ok, but no tub baths, swimming, hot tubs, etc). If incision gets wet, make sure to gently pat incision dry (do NOT rub or abrade incision) as soon as possible afterwards to keep incision as clean and dry as possible.     Follow Up    Please call the [facility] ([facility]) at [phone] to schedule an outpatient follow-up appointment with Dr. Rangel in 1-2 weeks for post-op check virtual follow-up. This is also the number you can call for any vascular surgery questions or concerns.    Address:  [address] [place] [address] [place] [address]    Contact:  [phone]     Follow-up and recommended labs and tests     Follow up with me,  Paxton Rangel, within 1-2 weeks by Video. to evaluate after surgery.  The following labs/tests are recommended: Left carotid duplex in 3 months.     Activity    Your activity upon discharge: activity as tolerated may shower.  May start driving tomorrow..     Diet    Follow this diet upon discharge: Advance to a regular diet as tolerated     Diet    Follow this diet upon discharge: Orders Placed This Encounter      Diet      Regular Diet Adult     Discharge Medications   Current Discharge Medication List      START taking these medications    Details   acetaminophen (TYLENOL) 325 MG tablet Take 2 tablets (650 mg) by mouth every 4 hours as needed for other (multimodal surgical pain management along with NSAIDS and opioid medication as indicated based on pain control and physical function.)  Qty:      Associated Diagnoses: Left carotid artery stenosis      clopidogrel (PLAVIX) 75 MG tablet Take 1 tablet (75 mg) by mouth daily  Qty: 30  tablet, Refills: 0    Associated Diagnoses: Left carotid artery stenosis         CONTINUE these medications which have NOT CHANGED    Details   amLODIPine (NORVASC) 10 MG tablet Take 10 mg by mouth every morning       aspirin (ASA) 81 MG EC tablet Take 81 mg by mouth every morning       atorvastatin (LIPITOR) 10 MG tablet Take 10 mg by mouth every evening       busPIRone (BUSPAR) 15 MG tablet Take 15 mg by mouth 2 times daily Morning and afternoon (around 1600)      calcium carbonate (OS-TIMMY) 600 MG tablet Take 2.5 tablets by mouth every morning       cloNIDine (CATAPRES) 0.1 MG tablet Take 0.1 mg by mouth 2 times daily Morning and afternoon (around 1600)      coenzyme Q-10 10 MG CAPS Take 1 tablet by mouth every evening       Cyanocobalamin 500 MCG CHEW Take 1 chew tab by mouth every morning      losartan (COZAAR) 100 MG tablet Take 100 mg by mouth every morning       methimazole (TAPAZOLE) 10 MG tablet Take 5 mg by mouth every evening (1/2 x 10mg)      metoprolol succinate ER (TOPROL-XL) 25 MG 24 hr tablet Take 25 mg by mouth every evening       !! Multiple Vitamins-Minerals (PRESERVISION AREDS PO) Take 1 capsule by mouth 2 times daily      !! multivitamin (CENTRUM SILVER) tablet Take 1 tablet by mouth every morning      omeprazole (PRILOSEC) 20 MG DR capsule Take 20 mg by mouth every morning       PARoxetine (PAXIL) 40 MG tablet Take 40 mg by mouth every morning       vitamin D3 (CHOLECALCIFEROL) 50 mcg (2000 units) tablet Take 1 tablet by mouth daily       !! - Potential duplicate medications found. Please discuss with provider.        Allergies   Allergies   Allergen Reactions     Phenylephrine Other (See Comments)     Heart palpitations while on Methimazole for hyperthyroidism     Natalya Gutierrez PA-C   Division of Vascular Surgery

## 2021-02-24 NOTE — PROGRESS NOTES
"VASCULAR SURGERY PROGRESS NOTE    Subjective:  No acute events overnight. Reports no neurologic deficits. Pain controlled and complains of only incisional pain. Ambulating without difficulty. Tolerating diet w/ no dysphagia.     Objective:    Intake/Output Summary (Last 24 hours) at 2/24/2021 0704  Last data filed at 2/24/2021 0600  Gross per 24 hour   Intake 3510.83 ml   Output 20 ml   Net 3490.83 ml     Labs:  ROUTINE IP LABS (Last four results)  BMP  Recent Labs   Lab 02/23/21  0624      POTASSIUM 3.9   CHLORIDE 102   TIMMY 9.6   CO2 27   BUN 14   CR 0.84   GLC 99     CBC  Recent Labs   Lab 02/23/21  1200        INRNo lab results found in last 7 days.  PHYSICAL EXAM:  BP (!) 140/70 (BP Location: Left arm)   Pulse 64   Temp 98.7  F (37.1  C) (Oral)   Resp 9   Ht 1.499 m (4' 11\")   Wt 55.7 kg (122 lb 12.8 oz)   SpO2 98%   BMI 24.80 kg/m    General: The patient is alert and oriented. Appropriate. No acute distress  Psych: Pleasant affect, Answers questions appropriately  Skin: Color appropriate for race, warm, dry.  Respiratory: Breathing unlabored  GI:  Abdomen soft, nontender to light palpation.  Neck: L CEA incision w/ steri-strips CDI w/ mild ecchymosis on the inferior aspect but no hematoma or drainage.  Neuro: motorsensory intact bilateral upper and lower extremities, CN intact    Imaging:   No new imaging.    ASSESSMENT:  71 yo F w/ 80% L ICA stenosis s/p L CEA POD1    PLAN:  Diet as tolerated  Pain control  Continue ASA/plavix  Ambulate as tolerated    Dispo: discharge today    Karma Mae MD  Vascular Surgery Fellow  Pager: [phone]    STAFF: As above.  Doing extremely well.  Essentially no pain.                Wd=A   CN XII and Neuro=A                  LDL at goal on  present statin.                 ASA/Plavix.  Home today.        Paxton Rangel MD                "

## 2021-03-01 ENCOUNTER — TELEPHONE (OUTPATIENT)
Dept: OTHER | Facility: CLINIC | Age: 71
End: 2021-03-01

## 2021-03-01 NOTE — TELEPHONE ENCOUNTER
Patient called, left voice message c/o constipation s/p surgery 2/23/21.    Advised trying OTC polyethylene glycol and colace as directed on the package.  Increase fluids and activity.  If she is still having difficulty she should contact her primary care provider as Dr. Rangel is out this week.    May Montes RN BSN  [facility]  [phone]

## 2021-03-01 NOTE — TELEPHONE ENCOUNTER
Spoke to Jackie to let her know that Dr. Rangel is out this week and that his nurse Rosario is out today.       Jackie stated that everything is going well.  She will await a call in the next few days to determine follow up with Dr. Rangel.    May Montes RN BSN  [facility]  [phone]

## 2021-03-02 NOTE — TELEPHONE ENCOUNTER
Post Hospital / Discharge Phone Call    Pt is s/p left carotid endarterectomy on 2/23/21 with Dr. Rangel.    Are you having any issues with pain? No  Do you have any concerns about your incision? No, incision  Do you have fever, nausea or vomiting? No  Do you have any additional concerns? Somewhat still constipated, patient reports she has been in touch with her PCP.  Patient reports she gets easily fatigued, advised patient to rest frequently and activity is as tolerated.    Pt scheduled on 3/8/21 for post op.     Rosario Fried, KERENN, RN-[facility] Vascular [place]

## 2021-03-07 ENCOUNTER — IMMUNIZATION (OUTPATIENT)
Dept: NURSING | Facility: CLINIC | Age: 71
End: 2021-03-07
Payer: COMMERCIAL

## 2021-03-07 PROCEDURE — 91303 PR COVID VAC JANSSEN AD26 0.5ML: CPT

## 2021-03-07 PROCEDURE — 0031A PR COVID VAC JANSSEN AD26 0.5ML: CPT

## 2021-03-08 ENCOUNTER — OFFICE VISIT (OUTPATIENT)
Dept: OTHER | Facility: CLINIC | Age: 71
End: 2021-03-08
Attending: PHYSICIAN ASSISTANT
Payer: COMMERCIAL

## 2021-03-08 VITALS — SYSTOLIC BLOOD PRESSURE: 122 MMHG | RESPIRATION RATE: 16 BRPM | HEART RATE: 59 BPM | DIASTOLIC BLOOD PRESSURE: 77 MMHG

## 2021-03-08 DIAGNOSIS — Z09 FOLLOW-UP EXAMINATION FOLLOWING SURGERY: Primary | ICD-10-CM

## 2021-03-08 PROCEDURE — 99024 POSTOP FOLLOW-UP VISIT: CPT | Performed by: PHYSICIAN ASSISTANT

## 2021-03-08 PROCEDURE — G0463 HOSPITAL OUTPT CLINIC VISIT: HCPCS

## 2021-03-08 NOTE — PROGRESS NOTES
"Jackie Torres is a 70 year old female who presents for:  Chief Complaint   Patient presents with     RECHECK     RCC 03/05/210 LB - 1st PO - s/p left carotid endarterectomy on 2/23/21 with Dr. Juan Carlos plata        Vitals:    Vitals:    03/08/21 0951   BP: 122/77   BP Location: Right arm   Patient Position: Chair   Cuff Size: Adult Regular   Pulse: 59   Resp: 16       BMI:  Estimated body mass index is 24.8 kg/m  as calculated from the following:    Height as of 2/23/21: 4' 11\" (1.499 m).    Weight as of 2/24/21: 122 lb 12.8 oz (55.7 kg).    Pain Score:  Data Unavailable        Jennifer Lynch CMA    "

## 2021-03-08 NOTE — PROGRESS NOTES
[facility] Note     CC: Post-op check     Subjective:  Jackie Torres is here for her first postoperative visit. The patient underwent a Left carotid endarterectomy by Dr. Rangel on 2/23/21. Today the patient reports she is doing well and has no concerns about their recovery. She currently does not need any pain medications. The patient is returning to a normal diet.     Objective:  Blood pressure 122/77, pulse 59, resp. rate 16  Resp - CTAB  Card - regular  Inc - Healing well, well approximated, without signs of infection and no drainage.  Steri strips removed.  Neck - No bruits bilaterally, no swelling. No ecchymosis  Ext - Palpable radial pulses bilaterally.     Assessment:  S/p left carotid endarterectomy with facial vein patch angioplasty     Plan:  Patient is recovering as anticipated.  Patient was instructed to call if fever, increasing pain, redness or drainage at the incision sites occurs.  She will follow up in 3 months with U/S imaging of the left carotid artery.  She was encouraged to return to he primary clinic at he convenience for general medical care.    Daniel Barboza PA-C    Please route or send letter to:  Primary Care Provider (PCP)

## 2021-03-09 DIAGNOSIS — I65.22 LEFT CAROTID ARTERY STENOSIS: Primary | ICD-10-CM

## 2021-04-10 ENCOUNTER — HEALTH MAINTENANCE LETTER (OUTPATIENT)
Age: 71
End: 2021-04-10

## 2021-04-29 ENCOUNTER — TRANSFERRED RECORDS (OUTPATIENT)
Dept: HEALTH INFORMATION MANAGEMENT | Facility: CLINIC | Age: 71
End: 2021-04-29

## 2021-06-17 ENCOUNTER — TELEPHONE (OUTPATIENT)
Dept: OTHER | Facility: CLINIC | Age: 71
End: 2021-06-17

## 2021-06-17 NOTE — TELEPHONE ENCOUNTER
Contacted Dr. Neal's office and M requesting recent records be faxed to [facility].    Spoke with patient.  She reports Dr. Neal's is planning on seeing her in a year for repeat carotid US and visit.    Will review records with Dr. Rangel once available.  Patient aware it's highly unlikely Dr. Rangel will need to see patient if she is following with Dr. Neal.    Rosario Fried, KERENN, RN-[facility] Vascular [place]

## 2021-06-17 NOTE — TELEPHONE ENCOUNTER
Jackie called stating that she did her follow up with Dr. Neal in [facility] on 4/29/2021 and he states that all is clear.     She is questioning if she needs to see Dr. Rangel again for the same thing?     Dr. Neal:  Phone: [phone]  Fax: [phone]    Please let Jackie know if she needs to completed follow up with Dr. Rangel.    Becca BURGESS  Route to [facility] Surgical Triage

## 2021-09-10 NOTE — BRIEF OP NOTE
[facility]    Brief Operative Note    Pre-operative diagnosis: Left carotid artery stenosis [I65.22]  Post-operative diagnosis Same as pre-operative diagnosis    Procedure: Procedure(s):  LEFT CAROTID ENDARTERECTOMY WITH FACIAL VEIN PATCH AND WITH ELECTROENCEPHALOGRAM  Surgeon: Surgeon(s) and Role:     * Paxton Rangel - Primary     * Karma Mae MD - Fellow - Assisting  Anesthesia: General   Estimated blood loss: 20cc  Drains: None  Specimens: * No specimens in log *  Findings:   focal plaque at bifurcation.  Complications: None.  Implants: * No implants in log *        
pelvic pain x 2 days,  lab, us, meds

## 2021-09-25 ENCOUNTER — HEALTH MAINTENANCE LETTER (OUTPATIENT)
Age: 71
End: 2021-09-25

## 2022-05-07 ENCOUNTER — HEALTH MAINTENANCE LETTER (OUTPATIENT)
Age: 72
End: 2022-05-07

## 2022-12-26 ENCOUNTER — HEALTH MAINTENANCE LETTER (OUTPATIENT)
Age: 72
End: 2022-12-26

## 2023-04-22 ENCOUNTER — HEALTH MAINTENANCE LETTER (OUTPATIENT)
Age: 73
End: 2023-04-22

## 2023-06-02 ENCOUNTER — HEALTH MAINTENANCE LETTER (OUTPATIENT)
Age: 73
End: 2023-06-02

## 2023-09-05 NOTE — ANESTHESIA PREPROCEDURE EVALUATION
"Anesthesia Pre-Procedure Evaluation    Patient: Jackie Torres   MRN: [id number] : 1950        Preoperative Diagnosis: Left carotid artery stenosis [I65.22]   Procedure : Procedure(s):  LEFT CAROTID ENDARTERECTOMY WITH ELECTROENCEPHALOGRAM     Past Medical History:   Diagnosis Date     Depression      Gastroesophageal reflux disease      Graves disease      Hypercholesteremia      Hypertension      Hypothyroidism       Past Surgical History:   Procedure Laterality Date     BREAST SURGERY      right breat biopsy     BREAST SURGERY      mastectomy partial right     COLONOSCOPY       EYE SURGERY      cataract bilateral     GYN SURGERY      curettage postpartum      Allergies   Allergen Reactions     Phenylephrine Other (See Comments)     Heart palpitations while on Methimazole for hyperthyroidism      Social History     Tobacco Use     Smoking status: Never Smoker     Smokeless tobacco: Never Used   Substance Use Topics     Alcohol use: Yes     Comment: 7 drinks a day      Wt Readings from Last 1 Encounters:   21 56.2 kg (124 lb)        Anesthesia Evaluation   Pt has had prior anesthetic. Type: General.    History of anesthetic complications  - PONV.      ROS/MED HX  ENT/Pulmonary:    (-) tobacco use   Neurologic:       Cardiovascular:     (+) Dyslipidemia hypertension-Peripheral Vascular Disease-- Carotid Stenosis. ---Irregular Heartbeat/Palpitations, Previous cardiac testing   Echo: Date: Results:    Stress Test: Date: Results:    ECG Reviewed: Date: 21 Results:  SR w/ multiple PVCs  Cath: Date: Results:      METS/Exercise Tolerance:     Hematologic:       Musculoskeletal:       GI/Hepatic:     (+) GERD,     Renal/Genitourinary:       Endo:     (+) thyroid problem, hypothyroidism,     Psychiatric/Substance Use:     (+) psychiatric history depression and anxiety alcohol abuse (\"at least 7 drinks/day\")     Infectious Disease:       Malignancy:       Other:               OUTSIDE LABS:  CBC: No results " found for: WBC, HGB, HCT, PLT  BMP: No results found for: NA, POTASSIUM, CHLORIDE, CO2, BUN, CR, GLC  COAGS: No results found for: PTT, INR, FIBR  POC: No results found for: BGM, HCG, HCGS  HEPATIC: No results found for: ALBUMIN, PROTTOTAL, ALT, AST, GGT, ALKPHOS, BILITOTAL, BILIDIRECT, CASSANDRA  OTHER: No results found for: PH, LACT, A1C, TIMMY, PHOS, MAG, LIPASE, AMYLASE, TSH, T4, T3, CRP, SED    Anesthesia Plan    ASA Status:  3   NPO Status:  NPO Appropriate    Anesthesia Type: General.   Induction: Intravenous.   Maintenance: Balanced.   Techniques and Equipment:     - Lines/Monitors: Arterial Line     Consents    Anesthesia Plan(s) and associated risks, benefits, and realistic alternatives discussed. Questions answered and patient/representative(s) expressed understanding.     - Discussed with:  Patient         Postoperative Care    Pain management: IV analgesics, Multi-modal analgesia.   PONV prophylaxis: Ondansetron (or other 5HT-3), Dexamethasone or Solumedrol, Background Propofol Infusion     Comments:                Zan Garcia MD   Cantharidin Pregnancy And Lactation Text: This medication has not been proven safe during pregnancy. It is unknown if this medication is excreted in breast milk.

## 2024-05-22 NOTE — PROGRESS NOTES
[facility]    Jackie Torres is a 70-year-old very healthy woman with hypertension.  She had been noticed on a screening exam to have a asymptomatic left carotid stenosis.  CTA was performed initially 8/20/2020 where the left carotid stenosis was 69% per NASCET criteria with minimal disease on the right, normal vertebral arteries, and a normal [place] of [place].  She had a follow-up CT scan performed on 2/12/2021 where the stenosis had increased now to 80% in the proximal left internal carotid artery with minimal disease otherwise.    She saw Dr. Neal of vascular surgery in his office on 2/17/2021.  He reviewed the images and the patient and with his high-grade stenosis referred her to see me today for possible left CEA.  I spoke with the patient early on the phone and she agrees to come to the office for a evaluation this afternoon.    She has been completely asymptomatic with no TIA symptoms.  No history of any CVI or other issues in the past.      PMH: Medications: Cozaar 100 mg daily, clonidine 0.1 mg daily, BuSpar 15 mg daily,                               Toprol-XL 25 mg daily, Norvasc 10 mg daily, Fosamax, Tapazole 10 mg,                               Lipitor 10 mg daily, Prilosec, aspirin, Paxil 40 mg daily             Medical: Hypertension.  Multiple medications but well controlled.  Normal blood pressure                                                   120/70 at home.                         Thyroid issues--history of hyperthyroidism on suppressive medication                          Hyperlipidemia on statin.  8/10/2020 LDL= 52   HDL= 78                          No history of CAD.                          No claudication symptoms             Surgical: Cataract surgery, benign breast lumpectomy, tubal ligation    Non-smoker.  Lives independently.    FMH: Mother with pacemaker.  Father with dementia.    12 point review of systems is essentially unremarkable.  Some mild arthritic  changes in her joints.    Exam: Alert and appropriate.  Normal affect.  Here with a friend.             Blood pressure 152/72 right arm.  153/75 left arm (anxious) pulse 75 regular             Weight= 56.2 kg   Height= 4 foot 11 inch   BMI= 25 kg/m              HEENT= supple, full range of motion, thin neck.  Left carotid bruit.             Chest= clear to auscultation             Cardiovascular= regular rate with no murmur             Abdomen= thin, soft, nontender.  No palpable AAA.             Extremities= no distal edema, normal sensation, no ulcers, good nail care.                      +3 PT pulses bilaterally.      We reviewed the CTA images from 2/12/2021.  Mild stenosis of the left proximal subclavian artery with a normal right innominate and subclavian artery.  Right carotid artery is widely patent with essentially no disease.  Normal [place] of [place] with no intracerebral disease no aneurysms.  Focal approximately 80% fairly smooth stenosis of the left proximal internal carotid artery with minimal external carotid artery disease.  Normal level of the bifurcation.  Diffuse enlargement with multiple nodules of thyroid gland.    8/6/2020 carotid duplex: Right ICA PSV= 85 cm/s.   Left ICA PSV= 453 cm/s.  Ratio= 5.91  2/2/2021 carotid duplex: Right ICA PSV= 52 cm/s.  Left ICA PSV= 398 cm/s ratio= 7.75      Impression: #1.  Progressive asymptomatic stenosis of left carotid bifurcation now approximately 80% with minimal contralateral and intracerebral disease.  This does put her at increased risk of a neurological event occurring.  She has very good risk factor control and the disease has progressed.  Due to this I would recommend that she undergo a left CEA with interoperative shunting and patch angioplasty.  Risk benefits of the operative procedure been discussed at length with the patient and her friend and this will be scheduled for this coming week.  She will stay in her blood pressure medications and  aspirin after the time of surgery.  Surgical procedure was discussed today along with the expected discharge on the first postoperative day and recovery along with her routine follow-up.                #2.  Chronic hypertension but no known CAD.  Blood pressure is normally 120/70 at home but higher today most likely due to her anxiety.  This will be followed.                #3.  Hyperlipidemia on statin.  LDL on last check was less than 70 which is goal.              #4.   Non-smoker.    Spent 45 minutes with the patient today.  All questions answered.  COVID-19 testing will be performed 3 to 4 days prior to the procedure.  Also discussed our protocol for visitors at [facility].      Paxton Rangel MD    CC:  Dr Lior Neal                                  I do not need any legal help

## 2024-06-23 ENCOUNTER — HEALTH MAINTENANCE LETTER (OUTPATIENT)
Age: 74
End: 2024-06-23

## (undated) DEVICE — SU SILK 3-0 TIE 24" SA74H

## (undated) DEVICE — GLOVE PROTEXIS W/NEU-THERA 7.5  2D73TE75

## (undated) DEVICE — DRAPE IOBAN INCISE 23X17" 6650EZ

## (undated) DEVICE — SURGICEL HEMOSTAT 2X3" 1953

## (undated) DEVICE — SHUNT SUNDT 3X4X10CM NL850-5060

## (undated) DEVICE — MANIFOLD NEPTUNE 4 PORT 700-20

## (undated) DEVICE — PREP CHLORAPREP W/ORANGE TINT 10.5ML 930715

## (undated) DEVICE — SU SILK 4-0 TIE 12X30" A303H

## (undated) DEVICE — SOL NACL 0.9% INJ 250ML BAG 2B1322Q

## (undated) DEVICE — NDL BLUNT 19GA 1.5"

## (undated) DEVICE — SU PROLENE 6-0 C-1DA 30" 8706H

## (undated) DEVICE — IONM UP TO 7 HOURS

## (undated) DEVICE — ESU GROUND PAD UNIVERSAL W/O CORD

## (undated) DEVICE — NDL 22GA 1.5"

## (undated) DEVICE — DRSG STERI STRIP 1/2X4" R1547

## (undated) DEVICE — SU PROLENE 7-0 BV-1DA 4X30" M8703

## (undated) DEVICE — SOL NACL 0.9% IRRIG 1000ML BOTTLE 2F7124

## (undated) DEVICE — SOL WATER IRRIG 1000ML BOTTLE 2F7114

## (undated) DEVICE — BLADE KNIFE BEAVER MINI BEAVER6400

## (undated) DEVICE — DECANTER VIAL 2006S

## (undated) DEVICE — NDL 19GA 1.5"

## (undated) DEVICE — SU MONOCRYL 4-0 PS-2 18" UND Y496G

## (undated) DEVICE — LINEN TOWEL PACK X5 5464

## (undated) DEVICE — SU VICRYL 3-0 SH 27" J316H

## (undated) DEVICE — NDL ANGIOCATH 20GA 1.25" 4056

## (undated) DEVICE — PACK VASCULAR SCV15VAFSB

## (undated) DEVICE — SYR 10ML FINGER CONTROL W/O NDL 309695

## (undated) DEVICE — SU SILK 2-0 TIE 24" SA75H

## (undated) DEVICE — SYR 03ML LL W/O NDL 309657

## (undated) DEVICE — DECANTER BAG 2002S

## (undated) DEVICE — IOM EEG SUPPLIES

## (undated) DEVICE — DRSG GAUZE 4X4" 2187

## (undated) RX ORDER — DEXAMETHASONE SODIUM PHOSPHATE 4 MG/ML
INJECTION, SOLUTION INTRA-ARTICULAR; INTRALESIONAL; INTRAMUSCULAR; INTRAVENOUS; SOFT TISSUE
Status: DISPENSED
Start: 2021-02-23

## (undated) RX ORDER — GLYCOPYRROLATE 0.2 MG/ML
INJECTION, SOLUTION INTRAMUSCULAR; INTRAVENOUS
Status: DISPENSED
Start: 2021-02-23

## (undated) RX ORDER — KETOROLAC TROMETHAMINE 30 MG/ML
INJECTION, SOLUTION INTRAMUSCULAR; INTRAVENOUS
Status: DISPENSED
Start: 2021-02-23

## (undated) RX ORDER — PROPOFOL 10 MG/ML
INJECTION, EMULSION INTRAVENOUS
Status: DISPENSED
Start: 2021-02-23

## (undated) RX ORDER — HYDROMORPHONE HYDROCHLORIDE 1 MG/ML
INJECTION, SOLUTION INTRAMUSCULAR; INTRAVENOUS; SUBCUTANEOUS
Status: DISPENSED
Start: 2021-02-23

## (undated) RX ORDER — ONDANSETRON 2 MG/ML
INJECTION INTRAMUSCULAR; INTRAVENOUS
Status: DISPENSED
Start: 2021-02-23

## (undated) RX ORDER — FENTANYL CITRATE 50 UG/ML
INJECTION, SOLUTION INTRAMUSCULAR; INTRAVENOUS
Status: DISPENSED
Start: 2021-02-23

## (undated) RX ORDER — BUPIVACAINE HYDROCHLORIDE 5 MG/ML
INJECTION, SOLUTION EPIDURAL; INTRACAUDAL
Status: DISPENSED
Start: 2021-02-23

## (undated) RX ORDER — LIDOCAINE HYDROCHLORIDE 20 MG/ML
INJECTION, SOLUTION EPIDURAL; INFILTRATION; INTRACAUDAL; PERINEURAL
Status: DISPENSED
Start: 2021-02-23

## (undated) RX ORDER — LIDOCAINE HYDROCHLORIDE 10 MG/ML
INJECTION, SOLUTION EPIDURAL; INFILTRATION; INTRACAUDAL; PERINEURAL
Status: DISPENSED
Start: 2021-02-23

## (undated) RX ORDER — NEOSTIGMINE METHYLSULFATE 1 MG/ML
VIAL (ML) INJECTION
Status: DISPENSED
Start: 2021-02-23

## (undated) RX ORDER — CEFAZOLIN SODIUM 2 G/100ML
INJECTION, SOLUTION INTRAVENOUS
Status: DISPENSED
Start: 2021-02-23

## (undated) RX ORDER — HEPARIN SODIUM 1000 [USP'U]/ML
INJECTION, SOLUTION INTRAVENOUS; SUBCUTANEOUS
Status: DISPENSED
Start: 2021-02-23

## (undated) RX ORDER — ASPIRIN 600 MG/1
SUPPOSITORY RECTAL
Status: DISPENSED
Start: 2021-02-23